# Patient Record
Sex: MALE | Race: WHITE
[De-identification: names, ages, dates, MRNs, and addresses within clinical notes are randomized per-mention and may not be internally consistent; named-entity substitution may affect disease eponyms.]

---

## 2017-03-14 ENCOUNTER — HOSPITAL ENCOUNTER (OUTPATIENT)
Dept: HOSPITAL 39 - GMAB | Age: 72
Discharge: HOME | End: 2017-03-14
Attending: FAMILY MEDICINE
Payer: MEDICARE

## 2017-03-14 DIAGNOSIS — N40.3: Primary | ICD-10-CM

## 2017-03-15 ENCOUNTER — HOSPITAL ENCOUNTER (OUTPATIENT)
Dept: HOSPITAL 39 - CT | Age: 72
Discharge: HOME | End: 2017-03-15
Attending: FAMILY MEDICINE
Payer: MEDICARE

## 2017-03-15 DIAGNOSIS — R31.21: Primary | ICD-10-CM

## 2017-03-15 DIAGNOSIS — E29.1: ICD-10-CM

## 2017-03-15 NOTE — CT
EXAM DESCRIPTION: 

Abdomen/Pelvis w/wo Contrast



CLINICAL HISTORY: 

MICROSOPIC HEMATURIA



COMPARISON: 

None. 



TECHNIQUE: CT of the abdomen and pelvis was performed with and

without IV contrast.. Multiple axial images and multiplanar

reconstructions were generated. 



FINDINGS: 

Atelectasis versus scar within the right lung base. Degenerative

change of the spine noted.



Multiple bilateral renal stones are noted. The largest stone is

seen within the mid segment of the left kidney and measures

approximately 6 mm in diameter. Bilateral renal cysts noted. The

largest is seen within the right midsegment of kidney and

measures 1.5 cm in diameter. No ureteral stone noted. The

prostate is very large with a prominent median lobe. The prostate

measures 5.6 cm in the transverse dimension. Urinary bladder is

grossly unremarkable.



Granulomatous disease of the spleen. The liver is unremarkable.

Gallbladder is present. Portal vein is widely patent. Bilateral

adrenal glands are normal. The pancreas is normal.



The small bowel is nonobstructed. The colon demonstrates evidence

of diverticulosis. No lymphadenopathy on today's study.



IMPRESSION: 

1.  The prostate is enlarged measuring 5.6 cm in diameter with a

prominent median lobe which indents the trigone of the urinary

bladder. 

2.  Bilateral renal stones noted, but no evidence of obstruction

at this time. The largest stone measures 6 mm in diameter and is

noted within the mid segment of left kidney.

3.  Diverticulosis of the colon, spondylosis of the lumbar spine

and atherosclerosis of a nonaneurysmal abdominal aorta are noted.







Electronically signed by:  Darrion Beltre MD  3/15/2017 9:36 AM

CDT

## 2017-10-11 ENCOUNTER — HOSPITAL ENCOUNTER (EMERGENCY)
Dept: HOSPITAL 39 - ER | Age: 72
Discharge: ANOTHER HOSPITAL | End: 2017-10-11
Payer: MEDICARE

## 2017-10-11 VITALS — TEMPERATURE: 97.6 F | OXYGEN SATURATION: 94 %

## 2017-10-11 VITALS — DIASTOLIC BLOOD PRESSURE: 69 MMHG | SYSTOLIC BLOOD PRESSURE: 109 MMHG

## 2017-10-11 DIAGNOSIS — Z79.899: ICD-10-CM

## 2017-10-11 DIAGNOSIS — D62: ICD-10-CM

## 2017-10-11 DIAGNOSIS — K92.1: Primary | ICD-10-CM

## 2017-10-11 DIAGNOSIS — I10: ICD-10-CM

## 2017-10-11 DIAGNOSIS — Z79.82: ICD-10-CM

## 2017-10-11 DIAGNOSIS — Z98.61: ICD-10-CM

## 2017-10-11 PROCEDURE — 82270 OCCULT BLOOD FECES: CPT

## 2017-10-11 PROCEDURE — 86901 BLOOD TYPING SEROLOGIC RH(D): CPT

## 2017-10-11 PROCEDURE — 85025 COMPLETE CBC W/AUTO DIFF WBC: CPT

## 2017-10-11 PROCEDURE — 86850 RBC ANTIBODY SCREEN: CPT

## 2017-10-11 PROCEDURE — 86900 BLOOD TYPING SEROLOGIC ABO: CPT

## 2017-10-11 PROCEDURE — 36415 COLL VENOUS BLD VENIPUNCTURE: CPT

## 2017-10-11 PROCEDURE — 80053 COMPREHEN METABOLIC PANEL: CPT

## 2017-10-11 PROCEDURE — 86922 COMPATIBILITY TEST ANTIGLOB: CPT

## 2017-10-11 NOTE — ED.PDOC
History of Present Illness





- General


Chief Complaint: GI Problem


Stated Complaint: blood in stools


Time Seen by Provider: 10/11/17 14:56


Source: patient, RN notes reviewed, Vital Signs reviewed, RN/MD - Spoke with PCP

, Dr. Up


Exam Limitations: no limitations





- History of Present Illness


Initial Comments: 





Patient comes in from PCP's office with c/o black, bloody stools X 5 days and 

Hgb of 5.3. + fatigue. Mild chest pain - intermittent. Had EGD and colonoscopy 3

/28/17 w/ Dr. Booth.


Timing/Duration: constant - X5 days


Severity: moderate


Improving Factors: nothing


Worsening Factors: nothing


Associated Symptoms: chest pain, malaise, shortness of breath


Allergies/Adverse Reactions: 


Allergies





NO KNOWN ALLERGY Allergy (Verified 10/11/17 15:17)


 








Home Medications: 


Ambulatory Orders





Aspirin [Aretha Low Dose] 81 mg PO DAILY 10/11/17 


Metoprolol Tartrate 25 mg PO BID 10/11/17 


Rosuvastatin Calcium 20 mg PO DAILY 10/11/17 


Tamsulosin [Flomax] 0.4 mg PO BEDTIME 10/11/17 











Review of Systems





- Review of Systems


Constitutional: States: malaise


EENTM: States: no symptoms reported


Respiratory: States: short of breath


Cardiology: States: chest pain


Gastrointestinal/Abdominal: States: see HPI, other - hematocezia.  Denies: 

abdominal pain, diarrhea, nausea, vomiting


Musculoskeletal: States: no symptoms reported


Skin: States: no symptoms reported


Neurological: States: no symptoms reported


Hematologic/Lymphatic: States: see HPI, anemia


All other Systems: No Change from Baseline





Past Medical History (General)





- Patient Medical History


Hx Cardiac Disorders: Yes - Stint


Hx Congestive Heart Failure: No


Hx Hypertension: Yes


Hx Diabetes: No





- Vaccination History


Hx Influenza Vaccination: No


Hx Pneumococcal Vaccination: No





- Social History


Hx Tobacco Use: No





Family Medical History





- Family History


  ** Father


Family History: Unknown


Living Status: Unknown





Physical Exam





- Physical Exam


General Appearance: Alert, Comfortable, No apparent distress, Well Developed, 

Well Groomed, Well Hydrated, Well Nourished


Neck: supple, normal inspection


Respiratory: lungs clear, normal breath sounds, no respiratory distress, no 

accessory muscle use


Cardiovascular/Chest: regular rate, rhythm, no gallop, no JVD, no murmur


Gastrointestinal/Abdominal: normal bowel sounds, non tender, soft, no 

organomegaly, no pulsatile mass


Rectal Exam: normal rectal tone, black stool, heme positive stool


Extremity: normal inspection


Neurologic: alert, normal mood/affect, oriented x 3


Skin Exam: normal color, warm/dry


Comments: 





 Vital Signs











  10/11/17





  15:12


 


Temperature 96.9 F L


 


Pulse Rate [ 101 H





Right Brachial] 


 


Respiratory 20





Rate 


 


Blood Pressure 106/69





[Right Arm] 


 


O2 Sat by Pulse 98





Oximetry 














Progress





- Progress


Progress: 





10/11/17 17:15


Started 1st pint of PRBC's





- Results/Orders


Results/Orders: 





 Laboratory Tests











  10/11/17 10/11/17 10/11/17





  15:25 15:25 15:45


 


WBC  7.8  


 


RBC  1.99 L  


 


Hgb  5.9 L*  


 


Hct  17.7 L  


 


MCV  88.9  


 


MCH  29.6  


 


MCHC  33.6  


 


RDW  14.3  


 


Plt Count  177  


 


MPV  9.6  


 


Absolute Neuts (auto)  4.50  


 


Absolute Lymphs (auto)  2.00  


 


Absolute Monos (auto)  0.80  


 


Absolute Eos (auto)  0.50 H  


 


Absolute Basos (auto)  0.10  


 


Neutrophils %  57.1  


 


Lymphocytes %  25.5  


 


Monocytes %  9.6 H  


 


Eosinophils %  6.6 H  


 


Basophils %  1.2  


 


Sodium   138 


 


Potassium   4.1 


 


Chloride   106 


 


Carbon Dioxide   27 


 


Anion Gap   9.1 L 


 


BUN   23 H 


 


Creatinine   1.03 


 


BUN/Creatinine Ratio   22.3 H 


 


Random Glucose   103 


 


Serum Osmolality   279.6 


 


Calcium   8.4 


 


Total Bilirubin   0.3 


 


AST   42 


 


ALT   48 


 


Alkaline Phosphatase   47 


 


Serum Total Protein   5.8 L 


 


Albumin   3.4 


 


Globulin   2.4 


 


Albumin/Globulin Ratio   1.4 


 


Stool Occult Blood   


 


Patient ABO/Rh    O POSITIVE


 


Antibody Screen    Negative


 


Crossmatch    See Detail














  10/11/17





  15:53


 


WBC 


 


RBC 


 


Hgb 


 


Hct 


 


MCV 


 


MCH 


 


MCHC 


 


RDW 


 


Plt Count 


 


MPV 


 


Absolute Neuts (auto) 


 


Absolute Lymphs (auto) 


 


Absolute Monos (auto) 


 


Absolute Eos (auto) 


 


Absolute Basos (auto) 


 


Neutrophils % 


 


Lymphocytes % 


 


Monocytes % 


 


Eosinophils % 


 


Basophils % 


 


Sodium 


 


Potassium 


 


Chloride 


 


Carbon Dioxide 


 


Anion Gap 


 


BUN 


 


Creatinine 


 


BUN/Creatinine Ratio 


 


Random Glucose 


 


Serum Osmolality 


 


Calcium 


 


Total Bilirubin 


 


AST 


 


ALT 


 


Alkaline Phosphatase 


 


Serum Total Protein 


 


Albumin 


 


Globulin 


 


Albumin/Globulin Ratio 


 


Stool Occult Blood  Positive


 


Patient ABO/Rh 


 


Antibody Screen 


 


Crossmatch 














Departure





- Departure


Clinical Impression: 


 Anemia due to blood loss, acute





Gastrointestinal bleeding


Qualifiers:


 GI bleed type/associated pathology: melena Qualified Code(s): K92.1 - Melena





Time of Disposition: 17:21


Disposition: Transfer to Hospital


Condition: Poor


Departure Forms:  ED Discharge - Pt. Copy, Patient Portal Self Enrollment


Referrals: 


Mario Up MD [Primary Care Provider] - 1-2 Weeks


Home Medications: 


Ambulatory Orders





Aspirin [Aretha Low Dose] 81 mg PO DAILY 10/11/17 


Metoprolol Tartrate 25 mg PO BID 10/11/17 


Rosuvastatin Calcium 20 mg PO DAILY 10/11/17 


Tamsulosin [Flomax] 0.4 mg PO BEDTIME 10/11/17 











Transfer to Outside Facility





- Transfer Information


Accepting Provider:: Dr. TOBIAS Casiano - Hospitalist


Accepting Facility: [facility]


Reason for Transfer: required specialist not available

## 2019-06-19 ENCOUNTER — HOSPITAL ENCOUNTER (OUTPATIENT)
Dept: HOSPITAL 39 - GMAE | Age: 74
End: 2019-06-19
Attending: FAMILY MEDICINE
Payer: MEDICARE

## 2019-06-19 DIAGNOSIS — I10: ICD-10-CM

## 2019-06-19 DIAGNOSIS — R53.83: Primary | ICD-10-CM
